# Patient Record
Sex: FEMALE | Race: WHITE | URBAN - METROPOLITAN AREA
[De-identification: names, ages, dates, MRNs, and addresses within clinical notes are randomized per-mention and may not be internally consistent; named-entity substitution may affect disease eponyms.]

---

## 2017-08-23 ENCOUNTER — TRANSFERRED RECORDS (OUTPATIENT)
Dept: HEALTH INFORMATION MANAGEMENT | Facility: CLINIC | Age: 82
End: 2017-08-23

## 2017-09-12 ENCOUNTER — OFFICE VISIT (OUTPATIENT)
Dept: NEUROLOGY | Facility: CLINIC | Age: 82
End: 2017-09-12

## 2017-09-12 VITALS — SYSTOLIC BLOOD PRESSURE: 114 MMHG | HEART RATE: 72 BPM | DIASTOLIC BLOOD PRESSURE: 60 MMHG

## 2017-09-12 DIAGNOSIS — M54.2 CERVICALGIA: Primary | ICD-10-CM

## 2017-09-12 DIAGNOSIS — M47.812 CERVICAL SPONDYLOSIS WITHOUT MYELOPATHY: ICD-10-CM

## 2017-09-12 RX ORDER — KETOCONAZOLE 20 MG/G
CREAM TOPICAL DAILY
COMMUNITY

## 2017-09-12 RX ORDER — LATANOPROST 50 UG/ML
1 SOLUTION/ DROPS OPHTHALMIC AT BEDTIME
COMMUNITY

## 2017-09-12 NOTE — LETTER
2017       RE: Tabitha Sheikh  [address]  [facility] [address]     Dear Colleague,    Thank you for referring your patient, Tabitha Sheikh, to the [facility] at [facility]. Please see a copy of my visit note below.    2017      Mehul Andre MD   [facility]    [address] [place] [address]      RE: Tabitha Sheikh   MRN: [id number]   : 1930      Dear Barry:      Thank you for referring Trinidad Sheikh.  She is an 87-year-old female here for evaluation of neck pain.      She has been experiencing pain mainly in the left posterior lateral neck for the last couple of months.  She cannot identify any specific injury.  The pain occasionally will radiate into her left trapezius muscle and upper chest but not further down into the left arm.  She finds having to sit in a sustained posture such as at [employer] will bring on the pain.  Riding in the car will bring it on.  She is comfortable lying down and has no difficulty sleeping.  She has not appreciated any change in strength or sensory symptoms.      She tried prednisone for a while without benefit.  She was placed on Zanaflex which did not help.  She has tried a couple of sessions of physical therapy which exacerbated the pain.      She is on gabapentin 100 mg twice a day for chronic back issues and has not tried to increase the dose further.      She did have a CT scan of the cervical spine that I was able to review.  She does have some degenerative changes.  There is no high grade spinal canal narrowing.  She has some foraminal narrowing but mainly off to the right.  There is some mild left neural foraminal narrowing at C4-C5.  She has a slight degenerative spondylolisthesis of C3 on C4 and C4 on C5.      Her past medical history is notable for 2 lumbar spine surgeries.  She has a history of hypertension,  angina, tachycardia and glaucoma.      CURRENT MEDICATIONS:   1.  Amlodipine.   2.  Aspirin 81 mg.   3.  Calcium with vitamin D.   4.  Estradiol vaginal cream.   5.  Gabapentin 100 mg twice a day.   6.  Isosorbide.     7.  Xalatan.   8.  Metoprolol.     9.  Zantac.   10.  Tamsulosin.      ALLERGIES:  She is allergic to ciprofloxacin, lisinopril, shellfish, and sulfa.      She lives with her  who was also present today.  She does not smoke or use alcohol.      Examination reveals a patient who is alert and cooperative.  She appears younger than her stated age.  Heart rate 72.  Blood pressure 114/60.      She does have some limited cervical range of motion.  Foraminal compression testing is negative.  She is tender mainly in the left suboccipital region.      Aside from bilateral hearing aids, cranial nerves II-XII are intact.  Motor and sensory examinations are normal.  There is no sensory loss up the back of the head, over the left shoulder or down into the left arm.  Cerebellar function is normal.  Gait is normal.  Reflexes are 2+ in the upper extremities, trace at the knees and absent at the ankles.  Right plantar response is flexor, the left was difficult to .      IMPRESSION:   1.  Left-sided neck pain.   2.  Cervical spondylosis.   3.  No findings indicative of radiculopathy or myelopathy.      PLAN:  I did review her CT scan of the cervical spine with her.  I pointed out there are some degenerative changes but no serious findings indicative of nerve root or spinal cord compromise.      I suspect the pain is mainly mechanical in nature.  You wondered about occipital neuralgia and while she does have some tenderness in the left suboccipital region, there are not any other features indicative of that.      I had some suggestions that I made to her.  The first would be to utilize a cervical pillow when she is out riding in the car and I did give her a prescription for that.      I suggested she might  "consider increasing her gabapentin to 300 or 400 mg a day depending on how she tolerates it.      Finally, you might consider referral for trigger point injections if she is not improving.        Overall, she was just reassured to hear that I did not find any serious neurologic issues.  She joked she was afraid her \"head would fall off\" and I reassured her that I did not anticipate that.      Thank you very much for allowing me to participate in her care.      Sincerely,      Ruddy Russo MD           D: 2017 13:45   T: 2017 14:19   MT: ISAI      Name:     MYLENE BLACK   MRN:      [id number]        Account:      [id number]   :      1930           Service Date: 2017      Document: [id number]          "

## 2017-09-12 NOTE — PROGRESS NOTES
2017      Mehul Andre MD   [facility]    [address] [place] [address]      RE: Tabitha Sheikh   MRN: [id number]   : 1930      Dear Barry:      Thank you for referring Trinidad Sheikh.  She is an 87-year-old female here for evaluation of neck pain.      She has been experiencing pain mainly in the left posterior lateral neck for the last couple of months.  She cannot identify any specific injury.  The pain occasionally will radiate into her left trapezius muscle and upper chest but not further down into the left arm.  She finds having to sit in a sustained posture such as at [employer] will bring on the pain.  Riding in the car will bring it on.  She is comfortable lying down and has no difficulty sleeping.  She has not appreciated any change in strength or sensory symptoms.      She tried prednisone for a while without benefit.  She was placed on Zanaflex which did not help.  She has tried a couple of sessions of physical therapy which exacerbated the pain.      She is on gabapentin 100 mg twice a day for chronic back issues and has not tried to increase the dose further.      She did have a CT scan of the cervical spine that I was able to review.  She does have some degenerative changes.  There is no high grade spinal canal narrowing.  She has some foraminal narrowing but mainly off to the right.  There is some mild left neural foraminal narrowing at C4-C5.  She has a slight degenerative spondylolisthesis of C3 on C4 and C4 on C5.      Her past medical history is notable for 2 lumbar spine surgeries.  She has a history of hypertension, angina, tachycardia and glaucoma.      CURRENT MEDICATIONS:   1.  Amlodipine.   2.  Aspirin 81 mg.   3.  Calcium with vitamin D.   4.  Estradiol vaginal cream.   5.  Gabapentin 100 mg twice a day.   6.  Isosorbide.     7.  Xalatan.   8.  Metoprolol.     9.  Zantac.   10.  Tamsulosin.      ALLERGIES:  She is allergic to  "ciprofloxacin, lisinopril, shellfish, and sulfa.      She lives with her  who was also present today.  She does not smoke or use alcohol.      Examination reveals a patient who is alert and cooperative.  She appears younger than her stated age.  Heart rate 72.  Blood pressure 114/60.      She does have some limited cervical range of motion.  Foraminal compression testing is negative.  She is tender mainly in the left suboccipital region.      Aside from bilateral hearing aids, cranial nerves II-XII are intact.  Motor and sensory examinations are normal.  There is no sensory loss up the back of the head, over the left shoulder or down into the left arm.  Cerebellar function is normal.  Gait is normal.  Reflexes are 2+ in the upper extremities, trace at the knees and absent at the ankles.  Right plantar response is flexor, the left was difficult to .      IMPRESSION:   1.  Left-sided neck pain.   2.  Cervical spondylosis.   3.  No findings indicative of radiculopathy or myelopathy.      PLAN:  I did review her CT scan of the cervical spine with her.  I pointed out there are some degenerative changes but no serious findings indicative of nerve root or spinal cord compromise.      I suspect the pain is mainly mechanical in nature.  You wondered about occipital neuralgia and while she does have some tenderness in the left suboccipital region, there are not any other features indicative of that.      I had some suggestions that I made to her.  The first would be to utilize a cervical pillow when she is out riding in the car and I did give her a prescription for that.      I suggested she might consider increasing her gabapentin to 300 or 400 mg a day depending on how she tolerates it.      Finally, you might consider referral for trigger point injections if she is not improving.        Overall, she was just reassured to hear that I did not find any serious neurologic issues.  She joked she was afraid her \"head " "would fall off\" and I reassured her that I did not anticipate that.      Thank you very much for allowing me to participate in her care.      Sincerely,      MD SRI Mata MD             D: 2017 13:45   T: 2017 14:19   MT: ISAI      Name:     MYLENE BLACK   MRN:      [id number]        Account:      [id number]   :      1930           Service Date: 2017      Document: [id number]      "

## 2017-09-12 NOTE — MR AVS SNAPSHOT
After Visit Summary   2017    Tabitha Sheikh    MRN: [id number]           Patient Information     Date Of Birth          1930        Visit Information        Provider Department      2017 1:00 PM Ruddy Russo MD [facility]        Today's Diagnoses     Cervicalgia    -  1    Cervical spondylosis without myelopathy           Follow-ups after your visit        Follow-up notes from your care team     Discussed this visit Return if symptoms worsen or fail to improve.      Who to contact     Please call your clinic at [phone] to:    Ask questions about your health    Make or cancel appointments    Discuss your medicines    Learn about your test results    Speak to your doctor   If you have compliments or concerns about an experience at your clinic, or if you wish to file a complaint, please contact [facility] Patient Relations at [phone] or email us at [email or web address]         Additional Information About Your Visit        MyChart Information     [employer] is an electronic gateway that provides easy, online access to your medical records. With [employer], you can request a clinic appointment, read your test results, renew a prescription or communicate with your care team.     To sign up for [employer] visit the website at [email or web address]   You will be asked to enter the access code listed below, as well as some personal information. Please follow the directions to create your username and password.     Your access code is: [email or web address]  Expires: 2017  1:34 PM     Your access code will  in 90 days. If you need help or a new code, please contact your [facility] or call [phone] for assistance.        Care EveryWhere ID     This is your Care EveryWhere ID. This could be used by other organizations to access your [facility]  records  [id number]        Your Vitals Were     Pulse                   72            Blood Pressure from Last 3 Encounters:   09/12/17 114/60    Weight from Last 3 Encounters:   No data found for Wt              Today, you had the following     No orders found for display         Today's Medication Changes          These changes are accurate as of: 9/12/17  1:34 PM.  If you have any questions, ask your nurse or doctor.               Start taking these medicines.        Dose/Directions    order for DME   Used for:  Cervicalgia, Cervical spondylosis without myelopathy        Cervical pillow   Quantity:  1 Device   Refills:  1            Where to get your medicines      Some of these will need a paper prescription and others can be bought over the counter.  Ask your nurse if you have questions.     Bring a paper prescription for each of these medications     order for DME                Primary Care Provider Office Phone # Fax #    Mehul Andre [phone]       [facility] [address] [facility] [address]        Equal Access to Services     YONG FLOOD : Hadii aad ku hadasho Soomaali, waaxda luqadaha, qaybta kaalmada adeegyada, waxay idiin hayaan adeeleazar kharash la'aan . So [facility] [phone].    ATENCIÓN: Si habla español, tiene a guadalupe disposición servicios gratuitos de asistencia lingüística. Llame al [phone].    We comply with applicable federal civil rights laws and [place] laws. We do not discriminate on the basis of race, color, national origin, age, disability sex, sexual orientation or gender identity.            Thank you!     Thank you for choosing [facility]  for your care. Our goal is always to provide you with excellent care. Hearing back from our patients is one way we can continue to improve our services. Please take a few minutes to complete the written survey that you may receive in the  mail after your visit with us. Thank you!             Your Updated Medication List - Protect others around you: Learn how to safely use, store and throw away your medicines at www.disposemymeds.org.          This list is accurate as of: 9/12/17  1:34 PM.  Always use your most recent med list.                   Brand Name Dispense Instructions for use Diagnosis    AMLODIPINE BESYLATE PO      Take 5 mg by mouth daily        ASPIRIN PO      Take 81 mg by mouth daily        calcium carbonate-vitamin D 600-400 MG-UNIT Chew           ESTRADIOL 0.1MG/GM CREAM      Insert 1 gram vaginally 2-3 times per week        GABAPENTIN PO      Take 100 mg by mouth 2 times daily        ISOSORBIDE DINITRATE PO      Take 10 mg by mouth 2 times daily        ketoconazole 2 % cream    NIZORAL     Apply topically daily        latanoprost 0.005 % ophthalmic solution    XALATAN     1 drop At Bedtime        METOPROLOL TARTRATE PO      Take 25 mg by mouth 2 times daily        order for DME     1 Device    Cervical pillow    Cervicalgia, Cervical spondylosis without myelopathy       TAMSULOSIN HCL PO      Take 0.4 capsules by mouth        ZANTAC PO      Take 150 mg by mouth At Bedtime

## 2021-05-29 ENCOUNTER — RECORDS - HEALTHEAST (OUTPATIENT)
Dept: ADMINISTRATIVE | Facility: CLINIC | Age: 86
End: 2021-05-29

## 2022-01-24 ENCOUNTER — LAB REQUISITION (OUTPATIENT)
Dept: LAB | Facility: CLINIC | Age: 87
End: 2022-01-24

## 2022-01-24 DIAGNOSIS — Z03.818 ENCOUNTER FOR OBSERVATION FOR SUSPECTED EXPOSURE TO OTHER BIOLOGICAL AGENTS RULED OUT: ICD-10-CM

## 2022-04-08 ENCOUNTER — LAB REQUISITION (OUTPATIENT)
Dept: LAB | Facility: CLINIC | Age: 87
End: 2022-04-08
Payer: COMMERCIAL

## 2022-04-08 DIAGNOSIS — I25.10 ATHEROSCLEROTIC HEART DISEASE OF NATIVE CORONARY ARTERY WITHOUT ANGINA PECTORIS: ICD-10-CM

## 2022-04-09 LAB
ANION GAP SERPL CALCULATED.3IONS-SCNC: 9 MMOL/L (ref 5–18)
BUN SERPL-MCNC: 17 MG/DL (ref 8–28)
CALCIUM SERPL-MCNC: 9.2 MG/DL (ref 8.5–10.5)
CHLORIDE BLD-SCNC: 104 MMOL/L (ref 98–107)
CO2 SERPL-SCNC: 26 MMOL/L (ref 22–31)
CREAT SERPL-MCNC: 0.96 MG/DL (ref 0.6–1.1)
GFR SERPL CREATININE-BSD FRML MDRD: 56 ML/MIN/1.73M2
GLUCOSE BLD-MCNC: 85 MG/DL (ref 70–125)
POTASSIUM BLD-SCNC: 4.1 MMOL/L (ref 3.5–5)
SODIUM SERPL-SCNC: 139 MMOL/L (ref 136–145)

## 2022-04-09 PROCEDURE — 36415 COLL VENOUS BLD VENIPUNCTURE: CPT | Mod: ORL | Performed by: INTERNAL MEDICINE

## 2022-04-09 PROCEDURE — 80048 BASIC METABOLIC PNL TOTAL CA: CPT | Mod: ORL | Performed by: INTERNAL MEDICINE

## 2022-04-09 PROCEDURE — P9603 ONE-WAY ALLOW PRORATED MILES: HCPCS | Mod: ORL | Performed by: INTERNAL MEDICINE

## 2025-04-12 ENCOUNTER — LAB REQUISITION (OUTPATIENT)
Dept: LAB | Facility: CLINIC | Age: OVER 89
End: 2025-04-12
Payer: COMMERCIAL

## 2025-04-12 DIAGNOSIS — I50.32 CHRONIC DIASTOLIC (CONGESTIVE) HEART FAILURE (H): ICD-10-CM

## 2025-04-12 DIAGNOSIS — N18.32 CHRONIC KIDNEY DISEASE, STAGE 3B (H): ICD-10-CM

## 2025-04-16 LAB
ANION GAP SERPL CALCULATED.3IONS-SCNC: 12 MMOL/L (ref 7–15)
BUN SERPL-MCNC: 20.5 MG/DL (ref 8–23)
CALCIUM SERPL-MCNC: 9.2 MG/DL (ref 8.8–10.4)
CHLORIDE SERPL-SCNC: 102 MMOL/L (ref 98–107)
CREAT SERPL-MCNC: 1.11 MG/DL (ref 0.51–0.95)
EGFRCR SERPLBLD CKD-EPI 2021: 46 ML/MIN/1.73M2
GLUCOSE SERPL-MCNC: 98 MG/DL (ref 70–99)
HCO3 SERPL-SCNC: 23 MMOL/L (ref 22–29)
NT-PROBNP SERPL-MCNC: 2863 PG/ML (ref 0–1800)
POTASSIUM SERPL-SCNC: 4.4 MMOL/L (ref 3.4–5.3)
SODIUM SERPL-SCNC: 137 MMOL/L (ref 135–145)

## 2025-04-16 PROCEDURE — 83880 ASSAY OF NATRIURETIC PEPTIDE: CPT | Mod: ORL | Performed by: PHYSICIAN ASSISTANT

## 2025-04-16 PROCEDURE — 36415 COLL VENOUS BLD VENIPUNCTURE: CPT | Mod: ORL | Performed by: PHYSICIAN ASSISTANT

## 2025-04-16 PROCEDURE — 80048 BASIC METABOLIC PNL TOTAL CA: CPT | Mod: ORL | Performed by: PHYSICIAN ASSISTANT

## 2025-04-16 PROCEDURE — P9603 ONE-WAY ALLOW PRORATED MILES: HCPCS | Mod: ORL | Performed by: PHYSICIAN ASSISTANT

## 2025-05-05 ENCOUNTER — LAB REQUISITION (OUTPATIENT)
Dept: LAB | Facility: CLINIC | Age: OVER 89
End: 2025-05-05

## 2025-05-05 DIAGNOSIS — A41.9 SEPSIS, UNSPECIFIED ORGANISM (H): ICD-10-CM

## 2025-05-06 LAB
ANION GAP SERPL CALCULATED.3IONS-SCNC: 13 MMOL/L (ref 7–15)
BUN SERPL-MCNC: 31.7 MG/DL (ref 8–23)
CALCIUM SERPL-MCNC: 10.2 MG/DL (ref 8.8–10.4)
CHLORIDE SERPL-SCNC: 104 MMOL/L (ref 98–107)
CREAT SERPL-MCNC: 1.3 MG/DL (ref 0.51–0.95)
EGFRCR SERPLBLD CKD-EPI 2021: 38 ML/MIN/1.73M2
ERYTHROCYTE [DISTWIDTH] IN BLOOD BY AUTOMATED COUNT: 20.9 % (ref 10–15)
GLUCOSE SERPL-MCNC: 93 MG/DL (ref 70–99)
HCO3 SERPL-SCNC: 23 MMOL/L (ref 22–29)
HCT VFR BLD AUTO: 41.2 % (ref 35–47)
HGB BLD-MCNC: 11.1 G/DL (ref 11.7–15.7)
MCH RBC QN AUTO: 20.5 PG (ref 26.5–33)
MCHC RBC AUTO-ENTMCNC: 26.9 G/DL (ref 31.5–36.5)
MCV RBC AUTO: 76 FL (ref 78–100)
PLATELET # BLD AUTO: 374 10E3/UL (ref 150–450)
POTASSIUM SERPL-SCNC: 4.3 MMOL/L (ref 3.4–5.3)
RBC # BLD AUTO: 5.42 10E6/UL (ref 3.8–5.2)
SODIUM SERPL-SCNC: 140 MMOL/L (ref 135–145)
WBC # BLD AUTO: 6 10E3/UL (ref 4–11)

## 2025-05-11 ENCOUNTER — LAB REQUISITION (OUTPATIENT)
Dept: LAB | Facility: CLINIC | Age: OVER 89
End: 2025-05-11
Payer: COMMERCIAL

## 2025-05-11 DIAGNOSIS — A41.9 SEPSIS, UNSPECIFIED ORGANISM (H): ICD-10-CM

## 2025-05-12 LAB
ANION GAP SERPL CALCULATED.3IONS-SCNC: 11 MMOL/L (ref 7–15)
BUN SERPL-MCNC: 34.6 MG/DL (ref 8–23)
CALCIUM SERPL-MCNC: 9.9 MG/DL (ref 8.8–10.4)
CHLORIDE SERPL-SCNC: 101 MMOL/L (ref 98–107)
CREAT SERPL-MCNC: 1.38 MG/DL (ref 0.51–0.95)
EGFRCR SERPLBLD CKD-EPI 2021: 35 ML/MIN/1.73M2
GLUCOSE SERPL-MCNC: 91 MG/DL (ref 70–99)
HCO3 SERPL-SCNC: 26 MMOL/L (ref 22–29)
HGB BLD-MCNC: 11 G/DL (ref 11.7–15.7)
POTASSIUM SERPL-SCNC: 4.8 MMOL/L (ref 3.4–5.3)
SODIUM SERPL-SCNC: 138 MMOL/L (ref 135–145)

## 2025-05-12 PROCEDURE — 80048 BASIC METABOLIC PNL TOTAL CA: CPT | Mod: ORL | Performed by: INTERNAL MEDICINE

## 2025-05-12 PROCEDURE — P9604 ONE-WAY ALLOW PRORATED TRIP: HCPCS | Mod: ORL | Performed by: INTERNAL MEDICINE

## 2025-05-12 PROCEDURE — 36415 COLL VENOUS BLD VENIPUNCTURE: CPT | Mod: ORL | Performed by: INTERNAL MEDICINE

## 2025-05-12 PROCEDURE — 85018 HEMOGLOBIN: CPT | Mod: ORL | Performed by: INTERNAL MEDICINE

## 2025-05-28 ENCOUNTER — LAB REQUISITION (OUTPATIENT)
Dept: LAB | Facility: CLINIC | Age: OVER 89
End: 2025-05-28
Payer: COMMERCIAL

## 2025-05-28 DIAGNOSIS — N18.32 CHRONIC KIDNEY DISEASE, STAGE 3B (H): ICD-10-CM

## 2025-05-28 DIAGNOSIS — E53.8 DEFICIENCY OF OTHER SPECIFIED B GROUP VITAMINS: ICD-10-CM

## 2025-05-28 DIAGNOSIS — D50.9 IRON DEFICIENCY ANEMIA, UNSPECIFIED: ICD-10-CM

## 2025-05-28 DIAGNOSIS — E03.8 OTHER SPECIFIED HYPOTHYROIDISM: ICD-10-CM

## 2025-06-04 LAB
ANION GAP SERPL CALCULATED.3IONS-SCNC: 10 MMOL/L (ref 7–15)
BUN SERPL-MCNC: 27.2 MG/DL (ref 8–23)
CALCIUM SERPL-MCNC: 9.4 MG/DL (ref 8.8–10.4)
CHLORIDE SERPL-SCNC: 103 MMOL/L (ref 98–107)
CREAT SERPL-MCNC: 1.16 MG/DL (ref 0.51–0.95)
EGFRCR SERPLBLD CKD-EPI 2021: 43 ML/MIN/1.73M2
ERYTHROCYTE [DISTWIDTH] IN BLOOD BY AUTOMATED COUNT: 22.2 % (ref 10–15)
GLUCOSE SERPL-MCNC: 77 MG/DL (ref 70–99)
HCO3 SERPL-SCNC: 25 MMOL/L (ref 22–29)
HCT VFR BLD AUTO: 37.9 % (ref 35–47)
HGB BLD-MCNC: 10.5 G/DL (ref 11.7–15.7)
MCH RBC QN AUTO: 21.4 PG (ref 26.5–33)
MCHC RBC AUTO-ENTMCNC: 27.7 G/DL (ref 31.5–36.5)
MCV RBC AUTO: 77 FL (ref 78–100)
PLATELET # BLD AUTO: 254 10E3/UL (ref 150–450)
POTASSIUM SERPL-SCNC: 4.6 MMOL/L (ref 3.4–5.3)
RBC # BLD AUTO: 4.91 10E6/UL (ref 3.8–5.2)
SODIUM SERPL-SCNC: 138 MMOL/L (ref 135–145)
T4 FREE SERPL-MCNC: 1.07 NG/DL (ref 0.9–1.7)
TSH SERPL DL<=0.005 MIU/L-ACNC: 2.7 UIU/ML (ref 0.3–4.2)
VIT B12 SERPL-MCNC: 645 PG/ML (ref 232–1245)
WBC # BLD AUTO: 6.9 10E3/UL (ref 4–11)

## 2025-06-04 PROCEDURE — P9604 ONE-WAY ALLOW PRORATED TRIP: HCPCS | Mod: ORL | Performed by: FAMILY MEDICINE

## 2025-06-04 PROCEDURE — 82607 VITAMIN B-12: CPT | Mod: ORL | Performed by: FAMILY MEDICINE

## 2025-06-04 PROCEDURE — 85027 COMPLETE CBC AUTOMATED: CPT | Mod: ORL | Performed by: FAMILY MEDICINE

## 2025-06-04 PROCEDURE — 80048 BASIC METABOLIC PNL TOTAL CA: CPT | Mod: ORL | Performed by: FAMILY MEDICINE

## 2025-06-04 PROCEDURE — 84439 ASSAY OF FREE THYROXINE: CPT | Mod: ORL | Performed by: FAMILY MEDICINE

## 2025-06-04 PROCEDURE — 36415 COLL VENOUS BLD VENIPUNCTURE: CPT | Mod: ORL | Performed by: FAMILY MEDICINE

## 2025-06-04 PROCEDURE — 84443 ASSAY THYROID STIM HORMONE: CPT | Mod: ORL | Performed by: FAMILY MEDICINE

## 2025-07-03 ENCOUNTER — LAB REQUISITION (OUTPATIENT)
Dept: LAB | Facility: CLINIC | Age: OVER 89
End: 2025-07-03
Payer: COMMERCIAL

## 2025-07-03 DIAGNOSIS — D64.9 ANEMIA, UNSPECIFIED: ICD-10-CM

## 2025-07-09 LAB
FERRITIN SERPL-MCNC: 29 NG/ML (ref 11–328)
IRON BINDING CAPACITY (ROCHE): 384 UG/DL (ref 240–430)
IRON SATN MFR SERPL: 9 % (ref 15–46)
IRON SERPL-MCNC: 33 UG/DL (ref 37–145)

## 2025-07-09 PROCEDURE — 82728 ASSAY OF FERRITIN: CPT | Mod: ORL | Performed by: FAMILY MEDICINE

## 2025-07-09 PROCEDURE — 83550 IRON BINDING TEST: CPT | Mod: ORL | Performed by: FAMILY MEDICINE

## 2025-07-09 PROCEDURE — P9603 ONE-WAY ALLOW PRORATED MILES: HCPCS | Mod: ORL | Performed by: FAMILY MEDICINE

## 2025-07-09 PROCEDURE — 36415 COLL VENOUS BLD VENIPUNCTURE: CPT | Mod: ORL | Performed by: FAMILY MEDICINE
